# Patient Record
Sex: FEMALE | Race: WHITE | Employment: FULL TIME | URBAN - METROPOLITAN AREA
[De-identification: names, ages, dates, MRNs, and addresses within clinical notes are randomized per-mention and may not be internally consistent; named-entity substitution may affect disease eponyms.]

---

## 2021-07-16 ENCOUNTER — HOSPITAL ENCOUNTER (EMERGENCY)
Age: 41
Discharge: HOME | End: 2021-07-16
Attending: EMERGENCY MEDICINE
Payer: COMMERCIAL

## 2021-07-16 VITALS
WEIGHT: 238 LBS | TEMPERATURE: 97.9 F | BODY MASS INDEX: 38.25 KG/M2 | HEIGHT: 66 IN | OXYGEN SATURATION: 99 % | DIASTOLIC BLOOD PRESSURE: 74 MMHG | HEART RATE: 86 BPM | RESPIRATION RATE: 18 BRPM | SYSTOLIC BLOOD PRESSURE: 122 MMHG

## 2021-07-16 DIAGNOSIS — R73.9 HYPERGLYCEMIA: Primary | ICD-10-CM

## 2021-07-16 LAB
ANION GAP SERPL CALCULATED.3IONS-SCNC: 16 MMOL/L (ref 3–16)
ANION GAP SERPL CALCULATED.3IONS-SCNC: 18 MMOL/L (ref 3–16)
BASE EXCESS VENOUS: -3 MMOL/L (ref -2–3)
BASE EXCESS VENOUS: -3.1 MMOL/L (ref -2–3)
BASOPHILS ABSOLUTE: 0 K/UL (ref 0–0.2)
BASOPHILS RELATIVE PERCENT: 0.2 %
BILIRUBIN URINE: ABNORMAL
BLOOD, URINE: ABNORMAL
BUN BLDV-MCNC: 14 MG/DL (ref 7–20)
BUN BLDV-MCNC: 20 MG/DL (ref 7–20)
CALCIUM SERPL-MCNC: 8.6 MG/DL (ref 8.3–10.6)
CALCIUM SERPL-MCNC: 9.6 MG/DL (ref 8.3–10.6)
CARBOXYHEMOGLOBIN: 1 % (ref 0–1.5)
CARBOXYHEMOGLOBIN: 1.1 % (ref 0–1.5)
CHLORIDE BLD-SCNC: 95 MMOL/L (ref 99–110)
CHLORIDE BLD-SCNC: 96 MMOL/L (ref 99–110)
CLARITY: CLEAR
CO2: 20 MMOL/L (ref 21–32)
CO2: 20 MMOL/L (ref 21–32)
COLOR: YELLOW
CREAT SERPL-MCNC: 0.7 MG/DL (ref 0.6–1.1)
CREAT SERPL-MCNC: 0.8 MG/DL (ref 0.6–1.1)
EOSINOPHILS ABSOLUTE: 0 K/UL (ref 0–0.6)
EOSINOPHILS RELATIVE PERCENT: 0.2 %
EPITHELIAL CELLS, UA: NORMAL /HPF (ref 0–5)
GFR AFRICAN AMERICAN: >60
GFR AFRICAN AMERICAN: >60
GFR NON-AFRICAN AMERICAN: >60
GFR NON-AFRICAN AMERICAN: >60
GLUCOSE BLD-MCNC: 269 MG/DL (ref 70–99)
GLUCOSE BLD-MCNC: 276 MG/DL (ref 70–99)
GLUCOSE BLD-MCNC: 298 MG/DL (ref 70–99)
GLUCOSE BLD-MCNC: 304 MG/DL (ref 70–99)
GLUCOSE BLD-MCNC: 332 MG/DL (ref 70–99)
GLUCOSE URINE: >=1000 MG/DL
HCG(URINE) PREGNANCY TEST: NEGATIVE
HCO3 VENOUS: 23.2 MMOL/L (ref 24–28)
HCO3 VENOUS: 23.3 MMOL/L (ref 24–28)
HCT VFR BLD CALC: 37.4 % (ref 36–48)
HEMOGLOBIN, VEN, REDUCED: 35.3 %
HEMOGLOBIN, VEN, REDUCED: 54 %
HEMOGLOBIN: 12.2 G/DL (ref 12–16)
KETONES, URINE: >=80 MG/DL
LEUKOCYTE ESTERASE, URINE: NEGATIVE
LYMPHOCYTES ABSOLUTE: 1.1 K/UL (ref 1–5.1)
LYMPHOCYTES RELATIVE PERCENT: 8.6 %
MCH RBC QN AUTO: 29.7 PG (ref 26–34)
MCHC RBC AUTO-ENTMCNC: 32.6 G/DL (ref 31–36)
MCV RBC AUTO: 91 FL (ref 80–100)
METHEMOGLOBIN VENOUS: 0 % (ref 0–1.5)
METHEMOGLOBIN VENOUS: 0.5 % (ref 0–1.5)
MICROSCOPIC EXAMINATION: YES
MONOCYTES ABSOLUTE: 0.4 K/UL (ref 0–1.3)
MONOCYTES RELATIVE PERCENT: 2.9 %
NEUTROPHILS ABSOLUTE: 11.1 K/UL (ref 1.7–7.7)
NEUTROPHILS RELATIVE PERCENT: 88.1 %
NITRITE, URINE: NEGATIVE
O2 SAT, VEN: 45 %
O2 SAT, VEN: 64 %
PCO2, VEN: 44.5 MMHG (ref 41–51)
PCO2, VEN: 45.1 MMHG (ref 41–51)
PDW BLD-RTO: 14.5 % (ref 12.4–15.4)
PERFORMED ON: ABNORMAL
PH UA: 5.5 (ref 5–8)
PH VENOUS: 7.32 (ref 7.35–7.45)
PH VENOUS: 7.33 (ref 7.35–7.45)
PLATELET # BLD: 378 K/UL (ref 135–450)
PMV BLD AUTO: 7.7 FL (ref 5–10.5)
PO2, VEN: 36.3 MMHG (ref 25–40)
PO2, VEN: <30 MMHG (ref 25–40)
POTASSIUM REFLEX MAGNESIUM: 4.3 MMOL/L (ref 3.5–5.1)
POTASSIUM REFLEX MAGNESIUM: 4.3 MMOL/L (ref 3.5–5.1)
PROTEIN UA: ABNORMAL MG/DL
RBC # BLD: 4.11 M/UL (ref 4–5.2)
RBC UA: NORMAL /HPF (ref 0–4)
SODIUM BLD-SCNC: 132 MMOL/L (ref 136–145)
SODIUM BLD-SCNC: 133 MMOL/L (ref 136–145)
SPECIFIC GRAVITY UA: 1.02 (ref 1–1.03)
TCO2 CALC VENOUS: 25 MMOL/L
TCO2 CALC VENOUS: 25 MMOL/L
URINE TYPE: ABNORMAL
UROBILINOGEN, URINE: 0.2 E.U./DL
WBC # BLD: 12.6 K/UL (ref 4–11)
WBC UA: NORMAL /HPF (ref 0–5)

## 2021-07-16 PROCEDURE — 36415 COLL VENOUS BLD VENIPUNCTURE: CPT

## 2021-07-16 PROCEDURE — 84703 CHORIONIC GONADOTROPIN ASSAY: CPT

## 2021-07-16 PROCEDURE — 82803 BLOOD GASES ANY COMBINATION: CPT

## 2021-07-16 PROCEDURE — 80048 BASIC METABOLIC PNL TOTAL CA: CPT

## 2021-07-16 PROCEDURE — 99283 EMERGENCY DEPT VISIT LOW MDM: CPT

## 2021-07-16 PROCEDURE — 6370000000 HC RX 637 (ALT 250 FOR IP): Performed by: STUDENT IN AN ORGANIZED HEALTH CARE EDUCATION/TRAINING PROGRAM

## 2021-07-16 PROCEDURE — 96372 THER/PROPH/DIAG INJ SC/IM: CPT

## 2021-07-16 PROCEDURE — 85025 COMPLETE CBC W/AUTO DIFF WBC: CPT

## 2021-07-16 PROCEDURE — 2580000003 HC RX 258: Performed by: STUDENT IN AN ORGANIZED HEALTH CARE EDUCATION/TRAINING PROGRAM

## 2021-07-16 PROCEDURE — 6360000002 HC RX W HCPCS: Performed by: STUDENT IN AN ORGANIZED HEALTH CARE EDUCATION/TRAINING PROGRAM

## 2021-07-16 PROCEDURE — 81001 URINALYSIS AUTO W/SCOPE: CPT

## 2021-07-16 PROCEDURE — 96374 THER/PROPH/DIAG INJ IV PUSH: CPT

## 2021-07-16 RX ORDER — INSULIN LISPRO 100 [IU]/ML
5 INJECTION, SOLUTION INTRAVENOUS; SUBCUTANEOUS ONCE
Status: COMPLETED | OUTPATIENT
Start: 2021-07-16 | End: 2021-07-16

## 2021-07-16 RX ORDER — SODIUM CHLORIDE, SODIUM LACTATE, POTASSIUM CHLORIDE, CALCIUM CHLORIDE 600; 310; 30; 20 MG/100ML; MG/100ML; MG/100ML; MG/100ML
1000 INJECTION, SOLUTION INTRAVENOUS ONCE
Status: COMPLETED | OUTPATIENT
Start: 2021-07-16 | End: 2021-07-16

## 2021-07-16 RX ORDER — QUETIAPINE FUMARATE 25 MG/1
25 TABLET, FILM COATED ORAL 2 TIMES DAILY
COMMUNITY

## 2021-07-16 RX ORDER — QUETIAPINE FUMARATE 50 MG/1
50 TABLET, EXTENDED RELEASE ORAL NIGHTLY
COMMUNITY

## 2021-07-16 RX ORDER — INSULIN GLARGINE 100 [IU]/ML
25 INJECTION, SOLUTION SUBCUTANEOUS NIGHTLY
Qty: 5 PEN | Refills: 3 | Status: SHIPPED | OUTPATIENT
Start: 2021-07-16

## 2021-07-16 RX ORDER — LAMOTRIGINE 200 MG/1
200 TABLET ORAL DAILY
COMMUNITY

## 2021-07-16 RX ORDER — ONDANSETRON 2 MG/ML
8 INJECTION INTRAMUSCULAR; INTRAVENOUS ONCE
Status: COMPLETED | OUTPATIENT
Start: 2021-07-16 | End: 2021-07-16

## 2021-07-16 RX ORDER — SODIUM CHLORIDE, SODIUM GLUCONATE, SODIUM ACETATE, POTASSIUM CHLORIDE AND MAGNESIUM CHLORIDE 526; 502; 368; 37; 30 MG/100ML; MG/100ML; MG/100ML; MG/100ML; MG/100ML
INJECTION, SOLUTION INTRAVENOUS CONTINUOUS
Status: DISCONTINUED | OUTPATIENT
Start: 2021-07-16 | End: 2021-07-16 | Stop reason: HOSPADM

## 2021-07-16 RX ADMIN — ONDANSETRON 8 MG: 2 INJECTION INTRAMUSCULAR; INTRAVENOUS at 11:53

## 2021-07-16 RX ADMIN — INSULIN HUMAN 5 UNITS: 100 INJECTION, SOLUTION PARENTERAL at 15:17

## 2021-07-16 RX ADMIN — SODIUM CHLORIDE, SODIUM LACTATE, POTASSIUM CHLORIDE, AND CALCIUM CHLORIDE 1000 ML: .6; .31; .03; .02 INJECTION, SOLUTION INTRAVENOUS at 11:51

## 2021-07-16 RX ADMIN — INSULIN GLARGINE 25 UNITS: 100 INJECTION, SOLUTION SUBCUTANEOUS at 18:58

## 2021-07-16 RX ADMIN — SODIUM CHLORIDE, SODIUM LACTATE, POTASSIUM CHLORIDE, AND CALCIUM CHLORIDE 1000 ML: .6; .31; .03; .02 INJECTION, SOLUTION INTRAVENOUS at 14:45

## 2021-07-16 RX ADMIN — SODIUM CHLORIDE, SODIUM GLUCONATE, SODIUM ACETATE, POTASSIUM CHLORIDE AND MAGNESIUM CHLORIDE: 526; 502; 368; 37; 30 INJECTION, SOLUTION INTRAVENOUS at 15:44

## 2021-07-16 RX ADMIN — INSULIN LISPRO 5 UNITS: 100 INJECTION, SOLUTION INTRAVENOUS; SUBCUTANEOUS at 18:24

## 2021-07-16 ASSESSMENT — ENCOUNTER SYMPTOMS
RHINORRHEA: 0
SHORTNESS OF BREATH: 0
DIARRHEA: 0
ABDOMINAL PAIN: 0
COUGH: 0
NAUSEA: 1
VOMITING: 1
CONSTIPATION: 0

## 2021-07-16 NOTE — ED PROVIDER NOTES
[id number]       Date of evaluation: 7/16/2021    Chief Complaint     Hyperglycemia      History of Present Illness     North Garcia is a 47 y.o. female with a history of DMI who presents to the ED for evaluation of hyperglycemia. Patient was admitted to a psychiatric facility yesterday. At that time they had her remove her insulin pump as they were not comfortable with managing it. Overnight patient had worsening hyperglycemia to the 400s. They attempted to manage this with NovoLog however did not have any Lantus or other long-acting insulin as patient has not prescribed this given her insulin pump. Patient reports nausea with one episode of nonbilious nonbloody vomiting. She denies any other concerns or complaints at this time. No fevers or chills. No chest pain or shortness of breath. No cough or URI symptoms. No urinary symptoms. No abdominal pain, diarrhea, or constipation. Patient has a history of frequent DKA and states that her symptoms today feel similar. Of note, patient is set to be released from the psychiatric facility on Monday at which time she will have access to her insulin pump again. Note, patient is currently admitted to a psychiatric facility for depression and suicidal thoughts. It says on her paperwork that this is an involuntary hold, however we discussed with staff from the facility who state that patient is not on a hold at this time. Patient denies any suicidal ideations at this time. Review of Systems     Review of Systems   Constitutional: Negative for chills and fever. HENT: Negative for congestion and rhinorrhea. Respiratory: Negative for cough and shortness of breath. Cardiovascular: Negative for chest pain and leg swelling. Gastrointestinal: Positive for nausea and vomiting. Negative for abdominal pain, constipation and diarrhea. Genitourinary: Negative for dysuria and hematuria. Musculoskeletal: Negative for arthralgias. Skin: Negative for rash. Neurological: Negative for syncope, light-headedness and headaches. Psychiatric/Behavioral: Negative for behavioral problems. All other systems reviewed and are negative. Past Medical, Surgical, Family, and Social History     She has no past medical history on file. She has no past surgical history on file. Her family history is not on file. She     Medications     Discharge Medication List as of 7/16/2021  7:00 PM      CONTINUE these medications which have NOT CHANGED    Details   !! QUEtiapine (SEROQUEL) 25 MG tablet Take 25 mg by mouth 2 times dailyHistorical Med      lamoTRIgine (LAMICTAL) 200 MG tablet Take 200 mg by mouth dailyHistorical Med      insulin regular (HUMULIN R;NOVOLIN R) 100 UNIT/ML injection Inject into the skin See Admin InstructionsHistorical Med      QUEtiapine (SEROQUEL XR) 50 MG extended release tablet Take 50 mg by mouth nightlyHistorical Med      !! QUEtiapine (SEROQUEL) 25 MG tablet Take 25 mg by mouth 2 times dailyHistorical Med       !! - Potential duplicate medications found. Please discuss with provider. Allergies     She is allergic to ceclor [cefaclor], erythromycin, keflex [cephalexin], lisinopril, and penicillins. Physical Exam     INITIAL VITALS: BP: 139/83, Temp: 97.9 °F (36.6 °C), Pulse: 118, Resp: 18, SpO2: 100 %   Physical Exam  Vitals and nursing note reviewed. Constitutional:       General: She is not in acute distress. Appearance: Normal appearance. She is not toxic-appearing. HENT:      Head: Normocephalic and atraumatic. Nose: Nose normal.      Mouth/Throat:      Mouth: Mucous membranes are moist.   Eyes:      Extraocular Movements: Extraocular movements intact. Pupils: Pupils are equal, round, and reactive to light. Cardiovascular:      Rate and Rhythm: Regular rhythm. Tachycardia present.    Pulmonary:      Effort: Pulmonary effort is normal. No respiratory distress. Abdominal:      General: Abdomen is flat. There is no distension. Palpations: Abdomen is soft. Musculoskeletal:         General: Normal range of motion. Cervical back: Normal range of motion and neck supple. Skin:     General: Skin is warm and dry. Capillary Refill: Capillary refill takes less than 2 seconds. Findings: No rash. Neurological:      General: No focal deficit present. Mental Status: She is alert and oriented to person, place, and time. Psychiatric:         Mood and Affect: Mood normal.         Behavior: Behavior normal.         DiagnosticResults     EKG   None.      RADIOLOGY:  No orders to display       LABS:   Results for orders placed or performed during the hospital encounter of 07/16/21   CBC Auto Differential   Result Value Ref Range    WBC 12.6 (H) 4.0 - 11.0 K/uL    RBC 4.11 4.00 - 5.20 M/uL    Hemoglobin 12.2 12.0 - 16.0 g/dL    Hematocrit 37.4 36.0 - 48.0 %    MCV 91.0 80.0 - 100.0 fL    MCH 29.7 26.0 - 34.0 pg    MCHC 32.6 31.0 - 36.0 g/dL    RDW 14.5 12.4 - 15.4 %    Platelets 107 244 - 714 K/uL    MPV 7.7 5.0 - 10.5 fL    Neutrophils % 88.1 %    Lymphocytes % 8.6 %    Monocytes % 2.9 %    Eosinophils % 0.2 %    Basophils % 0.2 %    Neutrophils Absolute 11.1 (H) 1.7 - 7.7 K/uL    Lymphocytes Absolute 1.1 1.0 - 5.1 K/uL    Monocytes Absolute 0.4 0.0 - 1.3 K/uL    Eosinophils Absolute 0.0 0.0 - 0.6 K/uL    Basophils Absolute 0.0 0.0 - 0.2 K/uL   Basic Metabolic Panel w/ Reflex to MG   Result Value Ref Range    Sodium 133 (L) 136 - 145 mmol/L    Potassium reflex Magnesium 4.3 3.5 - 5.1 mmol/L    Chloride 95 (L) 99 - 110 mmol/L    CO2 20 (L) 21 - 32 mmol/L    Anion Gap 18 (H) 3 - 16    Glucose 332 (H) 70 - 99 mg/dL    BUN 20 7 - 20 mg/dL    CREATININE 0.8 0.6 - 1.1 mg/dL    GFR Non-African American >60 >60    GFR African American >60 >60    Calcium 9.6 8.3 - 10.6 mg/dL   Urinalysis, reflex to microscopic   Result Value Ref Range    Color, UA Yellow Straw/Yellow    Clarity, UA Clear Clear    Glucose, Ur >=1000 (A) Negative mg/dL    Bilirubin Urine MODERATE (A) Negative    Ketones, Urine >=80 (A) Negative mg/dL    Specific Gravity, UA 1.025 1.005 - 1.030    Blood, Urine TRACE-INTACT (A) Negative    pH, UA 5.5 5.0 - 8.0    Protein, UA TRACE (A) Negative mg/dL    Urobilinogen, Urine 0.2 <2.0 E.U./dL    Nitrite, Urine Negative Negative    Leukocyte Esterase, Urine Negative Negative    Microscopic Examination YES     Urine Type NotGiven    Pregnancy, Urine   Result Value Ref Range    HCG(Urine) Pregnancy Test Negative Detects HCG level >20 MIU/mL   Blood Gas, Venous   Result Value Ref Range    pH, [place] 7.326 (L) 7.350 - 7.450    pCO2, [place] 44.5 41.0 - 51.0 mmHg    pO2, [place] 36.3 25 - 40 mmHg    HCO3, Venous 23.2 (L) 24.0 - 28.0 mmol/L    Base Excess, [place] -3.1 (L) -2.0 - 3.0 mmol/L    O2 Sat, [place] 64 Not established %    Carboxyhemoglobin 1.0 0.0 - 1.5 %    MetHgb, [place] 0.0 0.0 - 1.5 %    TC02 (Calc), [place] 25 mmol/L    Hemoglobin, [place], Reduced 35.30 %   Microscopic Urinalysis   Result Value Ref Range    WBC, UA None seen 0 - 5 /HPF    RBC, UA 3-4 0 - 4 /HPF    Epithelial Cells, UA 2-5 0 - 5 /HPF   Basic Metabolic Panel w/ Reflex to MG   Result Value Ref Range    Sodium 132 (L) 136 - 145 mmol/L    Potassium reflex Magnesium 4.3 3.5 - 5.1 mmol/L    Chloride 96 (L) 99 - 110 mmol/L    CO2 20 (L) 21 - 32 mmol/L    Anion Gap 16 3 - 16    Glucose 304 (H) 70 - 99 mg/dL    BUN 14 7 - 20 mg/dL    CREATININE 0.7 0.6 - 1.1 mg/dL    GFR Non-African American >60 >60    GFR African American >60 >60    Calcium 8.6 8.3 - 10.6 mg/dL   Blood Gas, Venous   Result Value Ref Range    pH, [place] 7.321 (L) 7.350 - 7.450    pCO2, [place] 45.1 41.0 - 51.0 mmHg    pO2, [place] <30.0 25 - 40 mmHg    HCO3, Venous 23.3 (L) 24.0 - 28.0 mmol/L    Base Excess, [place] -3.0 (L) -2.0 - 3.0 mmol/L    O2 Sat, [place] 45 Not established %    Carboxyhemoglobin 1.1 0.0 - 1.5 %    MetHgb, [place] 0.5 0.0 - 1.5 %    TC02 (Calc), [place] 25 mmol/L    Hemoglobin, [place], Reduced 54.00 %   POCT Glucose   Result Value Ref Range    POC Glucose 298 (H) 70 - 99 mg/dl    Performed on ACCU-CHEK    POCT Glucose   Result Value Ref Range    POC Glucose 276 (H) 70 - 99 mg/dl    Performed on ACCU-CHEK    POCT Glucose   Result Value Ref Range    POC Glucose 269 (H) 70 - 99 mg/dl    Performed on ACCU-CHEK        ED BEDSIDE ULTRASOUND:  None. RECENT VITALS:  BP: 122/74, Temp: 97.9 °F (36.6 °C), Pulse: 86,Resp: 18, SpO2: 99 %     Procedures     None. ED Course     Nursing Notes, Past Medical Hx, Past Surgical Hx, Social Hx, Allergies, and Family Hx were reviewed. The patient was given the followingmedications:  Orders Placed This Encounter   Medications    lactated ringers infusion 1,000 mL    lactated ringers infusion 1,000 mL    ondansetron (ZOFRAN) injection 8 mg    insulin regular (HUMULIN R;NOVOLIN R) injection 5 Units    electrolyte-A (PLASMALYTE-A) solution    insulin lispro (1 Unit Dial) 5 Units    insulin glargine (LANTUS;BASAGLAR) injection pen 25 Units    insulin glargine (LANTUS SOLOSTAR) 100 UNIT/ML injection pen     Sig: Inject 25 Units into the skin nightly     Dispense:  5 pen     Refill:  3       CONSULTS:  None    MEDICAL DECISION MAKING / ASSESSMENT / Acosta Rasheed is a 47 y.o. female with history of type 1 diabetes who presents emergency department for hyperglycemia. Patient stopped insulin pump use yesterday and has since had rising hyperglycemia. No infectious or other symptoms. Vital signs are notable for tachycardia but otherwise within normal limits. Physical exam is reassuring without focal abnormalities. Symptoms most consistent with hyperglycemia secondary to medication nonadherence. Also considered infectious etiologies, much less likely given lack of symptoms and known trigger. Also considered DKA versus HHS. Extensive work-up was obtained.   CBC is within normal limits without leukocytosis or other abnormalities. BMP is notable for anion gap acidosis. VBG with mild acidosis. Urinalysis with significant ketones. Urine pregnancy is notably negative. Patient made in stable condition throughout her time in the emergency department. Work-up most consistent with very mild DKA. Patient was resuscitated with 3 L of fluid and was given 5 units of regular insulin. Patient had significant improvement in her blood glucose and closed her gap, gap at time of discharge 16. Patient had p.o. intake and was given her home dose of 5 units lispro for this. Plan was made to discharge the patient back to the facility with a prescription for her previous home regimen of Lantus 25 units at bedtime. We discussed return instructions as well as use of her pump at discharge from the facility. Patient expressed understanding and is comfortable this plan. Patient was ultimately discharged home in stable condition. This patient was also evaluated by the attending physician. All care plans werediscussed and agreed upon. Clinical Impression     1.  Hyperglycemia        Disposition     PATIENT REFERRED TO:  LEANN Thompson - [facility]  [address] [place] [address]  [phone]    Schedule an appointment as soon as possible for a visit in 1 week        DISCHARGE MEDICATIONS:  Discharge Medication List as of 7/16/2021  7:00 PM      START taking these medications    Details   insulin glargine (LANTUS SOLOSTAR) 100 UNIT/ML injection pen Inject 25 Units into the skin nightly, Disp-5 pen, [id number]             DISPOSITION       Magno Del Rosario MD  07/16/21 Kayla Boyd

## 2021-07-16 NOTE — ED TRIAGE NOTES
Patient reports to ED from [facility] for high blood sugars, nausea, vomiting. States they took her insulin pump at approx 0300 and have been attempting to cover with short acting insulin but there was no doctor currently to order long acting insulin. Blood sugars over 500, before EMS took patient FSBS [id number]. A&Ox4, denies current SI, denies a plan, denies attempt.

## 2021-07-16 NOTE — ED NOTES
Date:  7/16/2021  Patient Name: Fernando Lung    Patient provided with discharge instructions and paperwork. Vitals signs reviewed and education completed. Patient signed for receipt of discharge packet and discharged to self. Patient stable at time of discharge with no further questions or needs.         Srikanth Crisostomo RN  7/16/2021 7:00 PM         Srikanth Crisostomo RN  07/16/21 [id number]